# Patient Record
Sex: FEMALE | Race: WHITE | Employment: FULL TIME | URBAN - METROPOLITAN AREA
[De-identification: names, ages, dates, MRNs, and addresses within clinical notes are randomized per-mention and may not be internally consistent; named-entity substitution may affect disease eponyms.]

---

## 2021-05-17 DIAGNOSIS — I10 HTN (HYPERTENSION): Primary | ICD-10-CM

## 2021-05-21 ENCOUNTER — HOSPITAL ENCOUNTER (OUTPATIENT)
Dept: LAB | Facility: CLINIC | Age: 61
Discharge: HOME | End: 2021-05-21
Attending: INTERNAL MEDICINE | Admitting: INTERNAL MEDICINE
Payer: COMMERCIAL

## 2021-05-21 DIAGNOSIS — I10 HTN (HYPERTENSION): ICD-10-CM

## 2021-05-21 PROCEDURE — 83835 ASSAY OF METANEPHRINES: CPT | Performed by: INTERNAL MEDICINE

## 2021-05-25 LAB
COLLECT DURATION TIME SPEC: 24 H
CREAT 24H UR-MRATE: 1218 MG/D (ref 500–1400)
CREAT UR-MCNC: 42 MG/DL
METANEPH 24H UR-MCNC: 26 UG/L
METANEPH 24H UR-MRATE: 75 UG/D (ref 36–229)
METANEPH+NORMETANEPH UR-IMP: NORMAL
METANEPH/CREAT 24H UR: 62 UG/G CRT (ref 0–300)
NORMETANEPHRINE 24H UR-MCNC: 103 UG/L
NORMETANEPHRINE 24H UR-MRATE: 299 UG/D (ref 95–650)
NORMETANEPHRINE/CREAT 24H UR: 245 UG/G CRT (ref 0–400)
SPECIMEN VOL ?TM UR: 2900 ML

## 2022-10-10 ENCOUNTER — OFFICE VISIT (OUTPATIENT)
Dept: FAMILY MEDICINE | Facility: CLINIC | Age: 62
End: 2022-10-10

## 2022-10-10 VITALS
BODY MASS INDEX: 33.86 KG/M2 | HEIGHT: 63 IN | HEART RATE: 71 BPM | OXYGEN SATURATION: 97 % | SYSTOLIC BLOOD PRESSURE: 148 MMHG | WEIGHT: 191.1 LBS | DIASTOLIC BLOOD PRESSURE: 84 MMHG | TEMPERATURE: 97.6 F

## 2022-10-10 DIAGNOSIS — Z71.89 ACP (ADVANCE CARE PLANNING): ICD-10-CM

## 2022-10-10 DIAGNOSIS — E03.8 OTHER SPECIFIED HYPOTHYROIDISM: ICD-10-CM

## 2022-10-10 DIAGNOSIS — R03.0 ELEVATED BLOOD PRESSURE READING WITHOUT DIAGNOSIS OF HYPERTENSION: Primary | ICD-10-CM

## 2022-10-10 DIAGNOSIS — J30.89 SEASONAL ALLERGIC RHINITIS DUE TO OTHER ALLERGIC TRIGGER: ICD-10-CM

## 2022-10-10 DIAGNOSIS — Z76.89 HEALTH CARE HOME: ICD-10-CM

## 2022-10-10 PROBLEM — J30.2 SEASONAL ALLERGIC RHINITIS: Status: ACTIVE | Noted: 2022-10-10

## 2022-10-10 PROCEDURE — 99203 OFFICE O/P NEW LOW 30 MIN: CPT | Performed by: FAMILY MEDICINE

## 2022-10-10 RX ORDER — MULTIPLE VITAMINS W/ MINERALS TAB 9MG-400MCG
1 TAB ORAL DAILY
COMMUNITY

## 2022-10-10 RX ORDER — VITS A,C,E/LUTEIN/MINERALS 300MCG-200
1 TABLET ORAL DAILY
COMMUNITY

## 2022-10-10 RX ORDER — VITAMIN E 268 MG
CAPSULE ORAL DAILY
COMMUNITY

## 2022-10-10 RX ORDER — LACTOBACILLUS RHAMNOSUS GG 10B CELL
1 CAPSULE ORAL 2 TIMES DAILY
COMMUNITY

## 2022-10-10 RX ORDER — BACLOFEN 20 MG
TABLET ORAL
COMMUNITY

## 2022-10-10 RX ORDER — TRIAMCINOLONE ACETONIDE 55 UG/1
2 SPRAY, METERED NASAL DAILY
COMMUNITY

## 2022-10-10 RX ORDER — LORATADINE 10 MG/1
10 TABLET ORAL DAILY
COMMUNITY

## 2022-10-10 NOTE — NURSING NOTE
Chief Complaint   Patient presents with     Establish Care     Hypertension     Recheck BP, this is normally 118/72 average, when she was at her endocrinology her BP was elevated, lost her father this year, mothers health has decreased, feeling very stressed      Pre-visit Screening:  Immunizations:  Unknown-will get records  Colonoscopy:  is up to date- will get records  Mammogram: is up to date- will get records  Asthma Action Test/Plan:  NA  PHQ9:  NA  GAD7:  NA  Questioned patient about current smoking habits Pt. has never smoked.  Ok to leave detailed message on voice mail for today's visit only Yes, phone # [phone]

## 2022-10-10 NOTE — PROGRESS NOTES
Assessment & Plan   Problem List Items Addressed This Visit        Other    ACP (advance care planning)    Health Care Home        1. Elevated blood pressure reading without diagnosis of hypertension  High here today. Watch at home. Goal is less than 130/80. If persistently higher, come in to discuss starting a medications.    2. ACP (advance care planning)      3. Health Care Home      4. Other specified hypothyroidism  Followed by endocrinology.    5. Seasonal allergic rhinitis due to other allergic trigger  Followed by allergist.           FUTURE APPOINTMENTS:       - Follow-up visit as needed.    No follow-ups on file.    Neema De Jesus MD  [facility]    Subjective     Nursing Notes:   Brittany Sanchez, RUBENS  10/10/2022  2:42 PM  Signed  Chief Complaint   Patient presents with     [facility]     Hypertension     Recheck BP, this is normally 118/72 average, when she was at her endocrinology her BP was elevated, lost her father this year, mothers health has decreased, feeling very stressed      Pre-visit Screening:  Immunizations:  Unknown-will get records  Colonoscopy:  is up to date- will get records  Mammogram: is up to date- will get records  Asthma Action Test/Plan:  NA  PHQ9:  NA  GAD7:  NA  Questioned patient about current smoking habits Pt. has never smoked.  Ok to leave detailed message on voice mail for today's visit only Yes, phone # [phone]           Rashmi Frazier is a 62 year old female who presents to clinic today for the following health issues   HPI     Here for high blood pressures off and on , is not currently on medications and would like to watch this at home for now.    Mom and dad have been sick, dad passed away over the summer. This has been stressful.  Other family members are ill. She is in sales. She is responsible for a lot of things in the family.          Review of Systems   Constitutional, HEENT, cardiovascular, pulmonary, gi and gu systems are  "negative, except as otherwise noted.      Objective    BP (!) 148/84 (BP Location: Right arm, Patient Position: Sitting, Cuff Size: Adult Large)   Pulse 71   Temp 97.6  F (36.4  C) (Temporal)   Ht 1.588 m (5' 2.5\")   Wt 86.7 kg (191 lb 1.6 oz)   SpO2 97%   BMI 34.40 kg/m    Body mass index is 34.4 kg/m .  Physical Exam   GENERAL: healthy, alert and no distress  RESP: lungs clear to auscultation - no rales, rhonchi or wheezes  CV: regular rate and rhythm, normal S1 S2, no S3 or S4, no murmur, click or rub, no peripheral edema and peripheral pulses strong  MS: no gross musculoskeletal defects noted, no edema  NEURO: Normal strength and tone, mentation intact and speech normal  PSYCH: mentation appears normal, affect normal/bright    No results found for any visits on 10/10/22.      "

## 2024-06-17 PROBLEM — Z76.89 HEALTH CARE HOME: Status: RESOLVED | Noted: 2022-10-10 | Resolved: 2024-06-17
